# Patient Record
Sex: FEMALE | Race: BLACK OR AFRICAN AMERICAN | NOT HISPANIC OR LATINO | Employment: UNEMPLOYED | URBAN - METROPOLITAN AREA
[De-identification: names, ages, dates, MRNs, and addresses within clinical notes are randomized per-mention and may not be internally consistent; named-entity substitution may affect disease eponyms.]

---

## 2019-10-03 ENCOUNTER — HOSPITAL ENCOUNTER (EMERGENCY)
Facility: HOSPITAL | Age: 24
Discharge: HOME | End: 2019-10-03
Attending: EMERGENCY MEDICINE

## 2019-10-03 VITALS
HEIGHT: 65 IN | RESPIRATION RATE: 14 BRPM | WEIGHT: 251.31 LBS | TEMPERATURE: 98 F | BODY MASS INDEX: 41.87 KG/M2 | HEART RATE: 79 BPM | DIASTOLIC BLOOD PRESSURE: 79 MMHG | OXYGEN SATURATION: 96 % | SYSTOLIC BLOOD PRESSURE: 121 MMHG

## 2019-10-03 DIAGNOSIS — H61.22 IMPACTED CERUMEN OF LEFT EAR: Primary | ICD-10-CM

## 2019-10-03 DIAGNOSIS — H92.02 OTALGIA OF LEFT EAR: ICD-10-CM

## 2019-10-03 PROCEDURE — 69210 REMOVE IMPACTED EAR WAX UNI: CPT | Mod: LT

## 2019-10-03 PROCEDURE — 99282 PR EMERGENCY DEPT VISIT,LEVEL II: ICD-10-PCS | Mod: 25,,, | Performed by: PHYSICIAN ASSISTANT

## 2019-10-03 PROCEDURE — 25000003 PHARM REV CODE 250: Performed by: PHYSICIAN ASSISTANT

## 2019-10-03 PROCEDURE — 69209 REMOVE IMPACTED EAR WAX UNI: CPT | Mod: LT,,, | Performed by: PHYSICIAN ASSISTANT

## 2019-10-03 PROCEDURE — 99283 EMERGENCY DEPT VISIT LOW MDM: CPT | Mod: 25

## 2019-10-03 PROCEDURE — 69209 PR REMOVAL IMPACTED CERUMEN USING IRRIGATION/LAVAGE, UNILATERAL: ICD-10-PCS | Mod: LT,,, | Performed by: PHYSICIAN ASSISTANT

## 2019-10-03 PROCEDURE — 99282 EMERGENCY DEPT VISIT SF MDM: CPT | Mod: 25,,, | Performed by: PHYSICIAN ASSISTANT

## 2019-10-03 RX ORDER — DOCUSATE SODIUM 50 MG/5ML
50 LIQUID ORAL ONCE
Status: COMPLETED | OUTPATIENT
Start: 2019-10-03 | End: 2019-10-03

## 2019-10-03 RX ADMIN — DOCUSATE SODIUM 50 MG: 50 LIQUID ORAL at 10:10

## 2019-10-04 NOTE — ED PROVIDER NOTES
"Encounter Date: 10/3/2019       History     Chief Complaint   Patient presents with    Otalgia     Pt c/o left ear pain X2 days. Pt reports going to [facility] and states "they didn't do anything". Denies draiange, fever, body aches or any other complaints. Reports decreased hearing to left ear.      Ms Hopkins is a 24yoF who presents for left ear pain; pertinent PMHx  reportedly 4mo gestation.  Patient endorses several days of left ear pain with decreased hearing.  Denies associated neck pain, fever chills, odynophagia, dysphagia, sinus congestion, history of ear infections. No drainage reported by patient.  No tinnitus.  Patient also reports she is new to town and does not have an OB Gyn for her estimated 4 months gestation. No prenatal care.  Denies abdominal cramping or vaginal bleeding.  The patients available PMH, PSH, Social History, medications, allergies, and triage vital signs were reviewed just prior to their medical evaluation.  A ten point review of systems was completed and is negative except as documented above.  Patient denies any other acute medical complaint.    Please be advised this text was dictated with Minim[employer] invasive devices software and may contain errors due to translation.           Review of patient's allergies indicates:   Allergen Reactions    Chocolate flavor     Peanut butter flavor      No past medical history on file.  No past surgical history on file.  No family history on file.  Social History     Tobacco Use    Smoking status: Not on file   Substance Use Topics    Alcohol use: Not on file    Drug use: Not on file     Review of Systems   Constitutional: Negative for chills and fever.   HENT: Positive for ear pain and hearing loss. Negative for congestion, facial swelling, rhinorrhea, sinus pressure, sinus pain, sore throat, tinnitus, trouble swallowing and voice change.    Eyes: Negative for visual disturbance.   Respiratory: Negative for cough.    Cardiovascular: Negative for chest " pain.   Gastrointestinal: Negative for abdominal pain, nausea and vomiting.   Genitourinary: Negative for pelvic pain and vaginal bleeding.   Musculoskeletal: Negative for neck pain.   Neurological: Negative for headaches.       Physical Exam     Initial Vitals [10/03/19 1938]   BP Pulse Resp Temp SpO2   121/79 79 14 98.2 °F (36.8 °C) 96 %      MAP       --         Physical Exam    Vitals reviewed.  Constitutional: She appears well-nourished. She is not diaphoretic. No distress.   Well-appearing female in NAD, VSS, afebrile, 96% on RA.     HENT:   Head: Normocephalic and atraumatic.   Right Ear: External ear normal.   Left Ear: External ear normal. There is tenderness. No swelling. A foreign body (cerumen impaction) is present.   Nose: Nose normal.   Mouth/Throat: Oropharynx is clear and moist. No oropharyngeal exudate.   Eyes: Conjunctivae and EOM are normal. Pupils are equal, round, and reactive to light. Right eye exhibits no discharge. Left eye exhibits no discharge.   Neck: Normal range of motion. Neck supple.   Cardiovascular: Normal rate, regular rhythm and intact distal pulses.   Pulmonary/Chest: Breath sounds normal. No respiratory distress. She has no wheezes. She has no rhonchi. She has no rales.   Lymphadenopathy:     She has no cervical adenopathy.   Neurological: She is alert and oriented to person, place, and time. No cranial nerve deficit.   Skin: Skin is warm and dry. Capillary refill takes less than 2 seconds. No rash noted. No erythema. No pallor.   Psychiatric:   Suspected developmental delay           ED Course   Ear Wax Removal  Date/Time: 10/3/2019 10:35 PM  Performed by: Winnie Ward PA-C  Authorized by: Alicia Rushing MD     Anesthesia:  Local anesthesia used: no  Ceruminolytics applied prior to the procedure.  Location details: left ear  Procedure type: irrigation (Curette, irrigation, Alligator forceps)    Cerumen Removal Results: Cerumen completely removed.  Patient tolerance:  Patient tolerated the procedure well with no immediate complications        Labs Reviewed - No data to display       Imaging Results    None          Medical Decision Making:   History:   Old Medical Records: I decided to obtain old medical records.  Old Records Summarized: records from clinic visits and records from previous admission(s).  Initial Assessment:   Patient 4m gravid presents for left ear pain, cerumen impaction noted on exam. External ear and surrounding structures unremarkable. VSS, afebrile  Differential Diagnosis:   DDx includes cerumen impaction, otitis media. Physical exam and history taking lower clinical suspicion for otitis externa, malignant otitis externa, viral URI, mastoiditis, parotitis.   ED Management:  Cerumen impaction completed with total removal. TM is clear with minimal surrounding erythema. No indication of otitis media at this time. Recommended safe ear cleaning practices and OB/PCP f/u. Patient agreed to plan of care and voiced understanding.  Discharged in stable condition with strict ED return precautions.    Winnie Ward PA-C  10/04/2019    I discussed the following case, diagnosis and plan of care with attending physician.                Attending Attestation:     Physician Attestation Statement for NP/PA:   I discussed this assessment and plan of this patient with the NP/PA, but I did not personally examine the patient. The face to face encounter was performed by the NP/PA.                     Clinical Impression:       ICD-10-CM ICD-9-CM   1. Impacted cerumen of left ear H61.22 380.4   2. Otalgia of left ear H92.02 388.70         Disposition:   Disposition: Discharged  Condition: Stable                        Winnie Ward PA-C  10/04/19 0907       Alicia Rushing MD  10/07/19 1028       Alicia Rushing MD  10/23/19 1846

## 2019-10-04 NOTE — ED NOTES
"Arpita Hopkins, an 24 y.o. female presents to the ED c/o  L ear pain and pt states " I cant hear" for 3 days. Pt was seen at [facility] [place] [facility] for arm pain and knee pain after she tripped and fall but per significant other they mentioned that she was having issue with her ear but never checked it. Pt also states that she is 4 mos pregnant but never seen an ob gyne.      Chief Complaint   Patient presents with    Otalgia     Pt c/o left ear pain X2 days. Pt reports going to [facility] and states "they didn't do anything". Denies draiange, fever, body aches or any other complaints. Reports decreased hearing to left ear.      Review of patient's allergies indicates:   Allergen Reactions    Chocolate flavor     Peanut butter flavor      No past medical history on file.    "

## 2019-10-04 NOTE — DISCHARGE INSTRUCTIONS
Go to regular doctor to have the ears cleaned.  Return to the emergency department if you develop fever or worsening pain.    Our goal in the emergency department is to always give you outstanding care and exceptional service. You may receive a survey by mail or e-mail in the next week regarding your experience in our ED. We would greatly appreciate your completing and returning the survey. Your feedback provides us with a way to recognize our staff who give very good care and it helps us learn how to improve when your experience was below our aspiration of excellence.